# Patient Record
Sex: MALE | Race: OTHER
[De-identification: names, ages, dates, MRNs, and addresses within clinical notes are randomized per-mention and may not be internally consistent; named-entity substitution may affect disease eponyms.]

---

## 2019-07-10 ENCOUNTER — HOSPITAL ENCOUNTER (EMERGENCY)
Dept: HOSPITAL 62 - ER | Age: 34
Discharge: HOME | End: 2019-07-10
Payer: SELF-PAY

## 2019-07-10 VITALS — DIASTOLIC BLOOD PRESSURE: 64 MMHG | SYSTOLIC BLOOD PRESSURE: 121 MMHG

## 2019-07-10 DIAGNOSIS — Z23: ICD-10-CM
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
## 2019-07-10 DIAGNOSIS — S61.012A: Primary | ICD-10-CM

## 2019-07-10 DIAGNOSIS — F17.200: ICD-10-CM

## 2019-07-10 PROCEDURE — 90715 TDAP VACCINE 7 YRS/> IM: CPT

## 2019-07-10 PROCEDURE — 99282 EMERGENCY DEPT VISIT SF MDM: CPT

## 2019-07-10 PROCEDURE — 90471 IMMUNIZATION ADMIN: CPT

## 2019-07-10 NOTE — ER DOCUMENT REPORT
ED General





- General


Chief Complaint: Finger Injury


Stated Complaint: FINGER LACERATION


Time Seen by Provider: 07/10/19 15:44


TRAVEL OUTSIDE OF THE [place] IN LAST 30 DAYS: No





- HPI


Notes: 





33-year-old male to the emergency department with complaints of left thumb 

laceration that occurred just prior to arrival.  Patient states that he was 

using a utility knife and he accidentally cut his thumb.  He is right-handed.  

He is unsure of his tetanus status.  Denies any change in range of motion or 

numbness and tingling.  He is [place]-speaking and can answer in some broken 

English.  He does have a friend who is with him bedside who translates for us.  

Denies any other complaints.





- Related Data


Allergies/Adverse Reactions: 


                                        





No Known Allergies Allergy (Unverified 07/10/19 16:02)


   











Past Medical History





- General


Information source: Patient, Friend





- Social History


Smoking Status: Current Every Day Smoker


Frequency of alcohol use: None


Drug Abuse: None


Family History: Reviewed & Not Pertinent





Review of Systems





- Review of Systems


Constitutional: denies: Chills, Fever


EENT: No symptoms reported


Cardiovascular: denies: Chest pain, Palpitations


Respiratory: denies: Cough, Short of breath


Gastrointestinal: No symptoms reported


Musculoskeletal: See HPI.  denies: Deformity


Skin: Other - laceration to left thumb


-: Yes All other systems reviewed and negative





Physical Exam





- Vital signs


Vitals: 





                                        











Temp Pulse Resp BP Pulse Ox


 


 98.6 F   55 L  18   121/64   98 


 


 07/10/19 15:34  07/10/19 15:34  07/10/19 15:34  07/10/19 15:34  07/10/19 15:34











Interpretation: Normal





- General


General appearance: Appears well


In distress: None





- HEENT


Head: Normocephalic, Atraumatic


Eyes: Normal


Pupils: PERRL





- Respiratory


Respiratory status: No respiratory distress


Chest status: Nontender


Breath sounds: Normal


Chest palpation: Normal





- Cardiovascular


Rhythm: Regular


Heart sounds: Normal auscultation


Murmur: No





- Back


Back: Normal





- Extremities


Notes: 





To the dorsal left thumb there is a 3 cm linear laceration with bleeding 

controlled.  Inspection of the wound and exploration does not show any foreign 

body.  Patient retains good 5 out of 5 strength against resistance in flexion 

and extension of the thumb.  There is no snuffbox tenderness.  Cap refill is 

less than 2 seconds.  Patient is able to wiggle all fingers.





Course





- Re-evaluation


Re-evalutation: 





07/10/19 16:50


Impression: Left thumb laceration.  No evidence of tendon laceration and no 

foreign body appreciated.  Tolerated repair well, 5 sutures applied to the 

thumb.  Will apply a finger splint and nonstick dressing to protect the thumb.  

His tetanus shot has been updated.  We will send home with 800 mg Motrin or any 

pain.  Suture removal in 7 days.





- Vital Signs


Vital signs: 





                                        











Temp Pulse Resp BP Pulse Ox


 


 98.6 F   55 L  18   121/64   98 


 


 07/10/19 15:34  07/10/19 15:34  07/10/19 15:34  07/10/19 15:34  07/10/19 15:34














Procedures





- Laceration/Wound Repair


  ** Left Dorsal Thumb


Time completed: 16:48


Wound length (cm): 3


Wound's Depth, Shape: Superficial


Laceration pre-procedure: Sterile PPE donned, Sterile drapes applied, Shur-Clens

applied


Anesthetic type: 1% Lidocaine


Volume Anesthetic (mLs): 3


Wound explored: Clean, No foreign body removed


Irrigated w/ Saline (mLs): 200


Wound Debrided: Minimal


Wound Repaired With: Sutures


Suture Size/Type: 5:0


Number of Sutures: 5


Layer Closure?: No


Post-procedure wound care: Sterile dressing applied, Splint applied


Post-procedure NV exam normal: Yes


Complications: No





Discharge





- Discharge


Clinical Impression: 


Thumb laceration


Qualifiers:


 Encounter type: initial encounter Damage to nail status: without damage Foreign

body presence: without foreign body Laterality: left Qualified Code(s): S61.012A

- Laceration without foreign body of left thumb without damage to nail, initial 

encounter





Condition: Stable


Disposition: HOME, SELF-CARE


Instructions:  Laceration Care ([facility]), Tetanus Immunization Given ([facility])


Additional Instructions: 


LACERATION CARE:





     Your laceration has been sutured to keep the skin edges aligned during 

healing.  The time of suture removal depends on the nature and location of your 

cut.  Please follow the care instructions the doctor has outlined for you and 

return for further care, according to the schedule you've been given.


     Keep the wound and dressing clean.  Unless you were told otherwise, you may

shower daily, blotting the wound dry with a clean, unused towel.  At other 

times, If the dressing gets wet or blood soaked, remove it and blot the wound 

dry, then reapply a new dressing.  Unless you were instructed otherwise, 

dressings should be changed at least daily.


     If any signs of infection occur (swelling, redness, drainage, increasing 

tenderness, red streaks, tender lumps in the armpit or groin above the 

laceration, or fever), see the doctor immediately.








SOAP CLEANSING:


     Gently wash the wound daily using a mild soap (like Ivory, Phisoderm, 

Neutrogena).  Use warm water, rubbing gently until all debris, ooze, and 

crusting have been washed from the wound.  Allow to dry briefly (about 10 

minutes) after cleaning.  Repeat this cleansing at least three times a day for 

the first two days and then once or twice a day.








ANTIBIOTIC OINTMENT PROTECTION:


  After cleansing, you should apply a thin coating of antibiotic ointment 

(Bacitracin, not Neosporin) to the wounds at least three times daily.  This 

lessens infection risk, and may decrease the amount of scarring.  Use a q-tip or

dull butter knife, not your finger, to apply this ointment.


     Any debris or ooze which builds up in the ointment should be gently rubbed 

off with a sterile gauze pad.  Harder crusting may need to be gently scrubbed 

off with a clean wash cloth with soap and warm water, perhaps applying a warm, 

wet wash cloth to the wound for ten minutes first.


     Development of redness, severe itching, or blistering may mean allergy to 

the ointment.  See the doctor.








TETANUS IMMUNIZATION GIVEN:


     You have been given an immunization against tetanus.  Please record this in

your records.  In general, a booster is needed only once every 10 years.  The 

tetanus shot protects against tetanus or "lockjaw," which is a complication of 

certain wound infections (the tetanus shot cannot protect against the actual 

infection).


     The immunization site may become warm and red due to local reaction.  If 

this occurs, apply warm compresses and take aspirin or ibuprofen to reduce 

inflammation and discomfort.  Return for evaluation if the reaction becomes 

severe.








FOLLOW-UP CARE:


       Your sutures should be removed in 7-10  days.





    To facilitate a timely removal of your sutures, you may return to the 

Emergency Department at [facility].  You do not need to call for 

an appointment, but the best time to come in for suture removal is early in the 

morning.





     If you have been referred to another physician for follow-up care, call 

that physicians office for an appointment as you were instructed.  If you 

experience a significant change in your laceration, or if you are concerned 

there may be an infection (swelling, redness, drainage, increasing tenderness, 

red streaks, tender lumps in the armpit or groin above the laceration, or 

fever), return to the Emergency Department immediately re-evaluation.








Prescriptions: 


Ibuprofen [Motrin 800 mg Tablet] 800 mg PO Q8H PRN #30 tab


 PRN Reason: